# Patient Record
Sex: MALE | Race: OTHER | HISPANIC OR LATINO | URBAN - METROPOLITAN AREA
[De-identification: names, ages, dates, MRNs, and addresses within clinical notes are randomized per-mention and may not be internally consistent; named-entity substitution may affect disease eponyms.]

---
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
## 2022-11-19 NOTE — ED PROVIDER NOTE - HISTORY ATTESTATION, MLM
No heavy lifting, pushing, or pulling greater than 10 lbs. X 1 week.  May shower. No tub soaks.  
I have reviewed and confirmed nurses' notes...

## 2024-02-22 ENCOUNTER — EMERGENCY (EMERGENCY)
Facility: HOSPITAL | Age: 24
LOS: 1 days | Discharge: HOME | End: 2024-02-22
Admitting: STUDENT IN AN ORGANIZED HEALTH CARE EDUCATION/TRAINING PROGRAM
Payer: COMMERCIAL

## 2024-02-22 VITALS
SYSTOLIC BLOOD PRESSURE: 144 MMHG | TEMPERATURE: 98 F | DIASTOLIC BLOOD PRESSURE: 80 MMHG | OXYGEN SATURATION: 98 % | RESPIRATION RATE: 18 BRPM | HEART RATE: 80 BPM

## 2024-02-22 PROCEDURE — 99283 EMERGENCY DEPT VISIT LOW MDM: CPT | Mod: 25

## 2024-02-22 PROCEDURE — 73564 X-RAY EXAM KNEE 4 OR MORE: CPT

## 2024-02-22 PROCEDURE — 99284 EMERGENCY DEPT VISIT MOD MDM: CPT

## 2024-02-22 PROCEDURE — 73564 X-RAY EXAM KNEE 4 OR MORE: CPT | Mod: 26,RT

## 2024-02-22 NOTE — ED ADULT NURSE NOTE - OBJECTIVE STATEMENT
Patient /co of right knee pain X 1 -2 weeks, noted after playing basketball, denies any fall or injury, no obvious deformity, able to bear weight and ambulate w/out any difficulty.

## 2024-02-22 NOTE — ED PROVIDER NOTE - OBJECTIVE STATEMENT
23 M denies pmh p/w R knee pain x 2 weeks.  pt reports dull aching pain over anterior knee, making it hard to straighten leg.  worsened the following day after playing basketball but denies any trauma.  pt reports pain improved since but not reslved.  denies f/c, skin changes, calf pain/swelling, numbness/weakness, paresthesias, trauma/twisting

## 2024-02-22 NOTE — ED PROVIDER NOTE - NSFOLLOWUPINSTRUCTIONS_ED_ALL_ED_FT
Take tylenol 650mg or motrin 400-800mg as needed every 4-6 hours for pain.   REST- Rest your hurting/injured joint or extremity to decrease pain and swelling for 24-48 hours    ICE- Apply ice to area of pain to decreased inflammation and pain, put towel/barrier between ice and skin. You can keep ice on for 20 minutes at a time 4-8 times daily   COMPRESSION- Wear ace wrap or brace for support to reduce swelling.  Make sure not to wrap too tight, loosen if skin feeling numb/tingling or skin turns blue   ELEVATION- Elevate hurting/injured area 6 or more inches about level of heart to decrease swelling/inflammation.  Use pillow under joint to elevate area    [facility] (Thursday afternoons) - call [phone] to schedule    Patellar Tendinitis  A person's leg, showing the location of the patellar tendon.  Patellar tendinitis is also called jumper's knee or patellar tendinopathy. This condition happens when there is damage to the patellar tendon. Tendons are cord-like tissues that connect muscles to bones. The patellar tendon connects the bottom of the kneecap (patella) to the top of the shin bone (tibia).    Patellar tendinitis causes pain in the front of the knee. The condition is classified into the following stages:  Stage 1: You have pain only after activity.  Stage 2: You have pain during and after activity.  Stage 3: You have pain at rest as well as during and after activity. The pain limits your ability to do the activity.  Stage 4: You have tendon tears. The tears severely limit your activity.  What are the causes?  This condition is caused by repeated (repetitive) stress on the tendon. This stress may cause the tendon to stretch, swell, thicken, or tear.    What increases the risk?  The following factors may make you more likely to develop this condition:  Participating in sports that involve running, kicking, and jumping, especially on hard surfaces. These include:  Basketball.  Volleyball.  Soccer.  Track and field.  Training too hard.  Having tight thigh muscles.  Having received steroid injections in the tendon.  Having had knee surgery.  Being 16–40 years old.  Having rheumatoid arthritis, diabetes, or kidney disease. These conditions interrupt blood flow to the knee, causing the tendon to weaken.  What are the signs or symptoms?  The main symptom of this condition is pain and swelling in the front of the knee. The pain usually starts slowly and gradually gets worse. It may become painful to straighten your leg. The pain may get worse when you walk, run, or jump.    How is this diagnosed?  This condition may be diagnosed based on:  Your symptoms.  Your medical history.  A physical exam. During the physical exam, your health care provider may check for:  Tenderness along the tendon just below the patella.  Tightness in your thigh muscles.  Pain when you straighten your knee.  Imaging tests, including:  X-rays. These will show the position and condition of your patella.  An MRI. This will show any abnormality of the tendon.  Ultrasound. This will show any swelling or other abnormalities of the tendon.  How is this treated?  Treatment for this condition depends on the stage of the condition. It may involve:  Avoiding activities that cause pain, such as jumping.  Icing and elevating your knee.  Having sound wave stimulation to promote healing.  Doing physical therapy exercises to improve movement and strength in your knee when pain and swelling improve.  Wearing a knee brace. This may be needed if your condition does not improve with treatment.  Using crutches or a walker. This may be needed if your condition does not improve with treatment.  Surgery. This may be done if you have stage 4 tendinitis.  Follow these instructions at home:  If you have a removable brace:    Wear the brace as told by your health care provider. Remove it only as told by your health care provider.  Check the skin around the brace every day. Tell your health care provider about any concerns.  Loosen the brace if your toes tingle, become numb, or turn cold and blue.  Keep the brace clean.  If the brace is not waterproof:  Do not let it get wet.  Cover it with a watertight covering when you take a bath or shower.  Ask your health care provider when it is safe for you to drive.  Managing pain, stiffness, and swelling    Bag of ice on a towel on the skin.  If directed, put ice on the injured area. To do this:  If you have a removable brace, remove it as told by your health care provider.  Put ice in a plastic bag.  Place a towel between your skin and the bag.  Leave the ice on for 20 minutes, 2–3 times a day.  Remove the ice if your skin turns bright red. This is very important. If you cannot feel pain, heat, or cold, you have a greater risk of damage to the area.  Move your toes often to reduce stiffness and swelling.  Raise (elevate) your knee above the level of your heart while you are sitting or lying down.  Activity    Do not use the injured limb to support your body weight until your health care provider says that you can. Use your crutches or a walker as told by your health care provider.  Do exercises as told by your health care provider or physical therapist.  Return to your normal activities as told by your health care provider. Ask your health care provider what activities are safe for you.  General instructions    Take over-the-counter and prescription medicines only as told by your health care provider.  Do not use any products that contain nicotine or tobacco. These products include cigarettes, chewing tobacco, and vaping devices, such as e-cigarettes. These can delay healing. If you need help quitting, ask your health care provider.  Keep all follow-up visits. This is important.  How is this prevented?  Warm up and stretch before being active.  Cool down and stretch after being active.  Give your body time to rest between periods of activity. You may need to reduce how often you play a sport that requires frequent jumping.  Make sure to use equipment that fits you.  Be safe and responsible while being active. This will help you avoid falls which can damage the tendon.  Do at least 150 minutes of moderate-intensity exercise each week, such as brisk walking or water aerobics.  Maintain physical fitness, including:  Strength.  Flexibility.  Cardiovascular fitness.  Endurance.  Contact a health care provider if:  Your symptoms have not improved in 6 weeks.  Your symptoms get worse.  Summary  Patellar tendinitis is also called jumper's knee or patellar tendinopathy. This condition happens when there is damage to the patellar tendon.  Treatment for this condition depends on the stage of the condition and may include rest, ice, exercises, a knee brace, and surgery.  Do not use the injured limb to support your body weight until your health care provider says that you can.  Take over-the-counter and prescription medicines only as told by your health care provider.  This information is not intended to replace advice given to you by your health care provider. Make sure you discuss any questions you have with your health care provider.

## 2024-02-22 NOTE — ED PROVIDER NOTE - PATIENT PORTAL LINK FT
You can access the FollowMyHealth Patient Portal offered by [facility] by registering at the following website: [email or web address]. By joining [facility]’s FollowMyHealth portal, you will also be able to view your health information using other applications (apps) compatible with our system.

## 2024-02-22 NOTE — ED ADULT NURSE NOTE - JUGULAR VENOUS DISTENTION
DISPLAY PLAN FREE TEXT DISPLAY PLAN FREE TEXT DISPLAY PLAN FREE TEXT DISPLAY PLAN FREE TEXT DISPLAY PLAN FREE TEXT DISPLAY PLAN FREE TEXT DISPLAY PLAN FREE TEXT DISPLAY PLAN FREE TEXT DISPLAY PLAN FREE TEXT DISPLAY PLAN FREE TEXT DISPLAY PLAN FREE TEXT DISPLAY PLAN FREE TEXT DISPLAY PLAN FREE TEXT DISPLAY PLAN FREE TEXT DISPLAY PLAN FREE TEXT DISPLAY PLAN FREE TEXT DISPLAY PLAN FREE TEXT DISPLAY PLAN FREE TEXT DISPLAY PLAN FREE TEXT DISPLAY PLAN FREE TEXT DISPLAY PLAN FREE TEXT DISPLAY PLAN FREE TEXT DISPLAY PLAN FREE TEXT DISPLAY PLAN FREE TEXT DISPLAY PLAN FREE TEXT DISPLAY PLAN FREE TEXT DISPLAY PLAN FREE TEXT DISPLAY PLAN FREE TEXT DISPLAY PLAN FREE TEXT DISPLAY PLAN FREE TEXT DISPLAY PLAN FREE TEXT DISPLAY PLAN FREE TEXT DISPLAY PLAN FREE TEXT DISPLAY PLAN FREE TEXT DISPLAY PLAN FREE TEXT absent

## 2024-02-22 NOTE — ED PROVIDER NOTE - CLINICAL SUMMARY MEDICAL DECISION MAKING FREE TEXT BOX
23 M denies pmh p/w R knee pain x 2 weeks, worse after playing bball but no direct injuries or twisting.  on exam vss, well appearing, ambulatory, RLE: mild ttp over patellar tendon, no effusion no deformity, able to SLR, FROM all joints, SILT, DP/PT Pulse 2+, no calf ttp.  suspect patellar tendonitis.  xray shows no e/o fx or dislocation.  recommend RICE and knee brace.  f/u with ortho outpt.  discussed strict return parameters

## 2024-02-22 NOTE — ED PROVIDER NOTE - CARE PROVIDER_API CALL
Arnaldo Mccracken  Orthopaedic Surgery  [address]  [place] [address]  Phone: [phone]  Fax: [phone]  Follow Up Time:

## 2024-02-22 NOTE — ED PROVIDER NOTE - PHYSICAL EXAMINATION
Gen: well appearing, no acute distress  Skin: warm/dry, no rash noted  Resp: breathing comfortably, speaking in full sentences, no dyspnea  RLE: mild ttp over patellar tendon, no effusion no deformity, able to SLR, FROM all joints, SILT, DP/PT Pulse 2+, no calf ttp  Neuro: alert/oriented, ambulatory

## 2024-02-26 DIAGNOSIS — M25.561 PAIN IN RIGHT KNEE: ICD-10-CM

## 2024-03-08 ENCOUNTER — APPOINTMENT (OUTPATIENT)
Dept: ORTHOPEDIC SURGERY | Facility: CLINIC | Age: 24
End: 2024-03-08

## 2024-03-08 NOTE — ASSESSMENT
[FreeTextEntry1] : LETITIA FERNANDO is a 23 year old male  with right knee pain. I discussed with the patient that their symptoms, signs, and imaging are most consistent with ***. We reviewed the natural history of this condition and treatment options ranging from conservative measures (activity modification, physical therapy, icing, oral anti-inflammatory and/or analgesic medications, steroid injection, HA gel injections, PRP injections) to surgical management. We agreed on the following plan:   XR taken and reviewed with patient today. Activity modification: low impact aerobic activity (stationary bike, elliptical, swimming) Recommend 150 min per week of moderate intensity aerobic activity Start Home Exercises for knee conditioning. Demonstration, resistance bands and handout provided. Physical therapy. Referral provided. Medication:    prescription provided. Advanced imaging: consider MRI if no symptomatic improvement Follow up in 6-8 weeks.

## 2024-03-08 NOTE — HISTORY OF PRESENT ILLNESS
[de-identified] : LETITIA FERNANDO is a 23 year old male who presents with right knee pain. States the onset of pain was No antecedent trauma or injury. Has not experienced previously. Pain is Pain is nonradiating. There is associated There is no associated swelling, stiffness, numbness, paraesthesia or weakness. Exacerbating factors are standing, walking for prolonged periods, climbing stairs, descending stairs, rising from seated position. Has tried Patient ambulates independently. Exercise: Has not tried PT Employment: [employer]

## 2024-03-14 ENCOUNTER — APPOINTMENT (OUTPATIENT)
Dept: ORTHOPEDIC SURGERY | Facility: CLINIC | Age: 24
End: 2024-03-14
Payer: MEDICAID

## 2024-03-14 VITALS
DIASTOLIC BLOOD PRESSURE: 79 MMHG | HEART RATE: 62 BPM | OXYGEN SATURATION: 97 % | HEIGHT: 67 IN | WEIGHT: 194 LBS | SYSTOLIC BLOOD PRESSURE: 124 MMHG | BODY MASS INDEX: 30.45 KG/M2

## 2024-03-14 DIAGNOSIS — M76.51 PATELLAR TENDINITIS, RIGHT KNEE: ICD-10-CM

## 2024-03-14 PROCEDURE — 73564 X-RAY EXAM KNEE 4 OR MORE: CPT | Mod: 50

## 2024-03-14 PROCEDURE — 99203 OFFICE O/P NEW LOW 30 MIN: CPT

## 2024-03-17 PROBLEM — M76.51 PATELLAR TENDINITIS OF RIGHT KNEE: Status: ACTIVE | Noted: 2024-03-17

## 2024-03-17 NOTE — PHYSICAL EXAM
[de-identified] : General: Well-nourished, well-developed, alert, and in no acute distress. Head: Normocephalic. Eyes: Pupils equal, extraocular muscles intact, normal sclera. Nose: No nasal discharge. Cardiovascular: Extremities are warm and well perfused. Distal pulses are symmetric bilaterally. Respiratory: No labored breathing. Extremities: Sensation is intact distally bilaterally. Distal pulses are symmetric bilaterally Lymphatic: No regional lymphadenopathy, no lymphedema Neurologic: No focal deficits Skin: Normal skin color, texture, and turgor Psychiatric: Normal affect  MSK: Examination of [right] knee:   Gait [non-antalgic] Slight varum alignment [Pain] with double leg squat Valgus moment with single leg squat No effusion No erythema, hematoma or skin lesion Nontender to palpation: medial joint line, lateral joint line, medial patellar facet, lateral patellar facet, quad tendon, patellar tendon, pes, Gerdy's tubercle, tibial tuberosity, popliteal fossa, hamstrings, ITB No warmth No Baker's cyst palpable ROM: 0-[120] [No] patellar crepitus   Log roll negative Lachman negative Anterior drawer negative Posterior drawer negative Varus/valgus stress negative at 0 and 30 deg Carmen negative Patellar grind negative Thessaly negative   Examination of [left] knee:   No effusion, erythema, hematoma or skin lesion Nontender to palpation: medial joint line, lateral joint line, medial patellar facet, lateral patellar facet, quad tendon, patellar tendon, pes, Gerdy's tubercle, tibial tuberosity, popliteal fossa, hamstrings, ITB No warmth No Baker's cyst palpable ROM: 0-120 [No] patellar crepitus   Log roll negative Lachman negative Anterior drawer negative Posterior drawer negative Varus/valgus stress negative at 0 and 30 deg Carmen negative Patellar grind negative Thessaly negative   Sensation is intact to light touch over the superficial and deep peroneal nerve distributions and the posterior tibial nerve distribution. Capillary refill is less than two seconds. Posterior tibial and dorsalis pedis pulses 2+ equal bilaterally. No calf swelling or tenderness bilaterally. Strength testing shows hip flexion 5/5, hip adduction 5/5, hip abduction 5/5, knee extension 5/5, knee flexion 5/5, dorsiflexion 5/5, plantar flexion 5/5, EHL 5/5 Reflexes: Patellar 2+, Achilles 2+

## 2024-03-17 NOTE — HISTORY OF PRESENT ILLNESS
[2] : a current pain level of 2/10 [Improving] : improving [Rarely] : ~He/She~ states the symptoms seem to be rarely occuring [de-identified] : LETITIA FERNANDO is a 23 year old male who presents with right knee pain. States the onset of pain was last month (February 10th)  Has not experienced previously. Started after during HIT mobility workout that involved lunges. Pt went to [facility] and was given dx of patellar tendonitis.  Pain is just below the patella and has improved greatly since injury.  Pain is nonradiating. There is no associated swelling, stiffness, numbness, paraesthesia or weakness. Exacerbating factors are working out for long periods of time.  Has tried daily icing of knee and acetaminophen use.  Patient ambulates independently. Exercise: Daily basketball and working out.  Has not tried PT. Employment:  of [employer]  Lives in the [place]

## 2024-03-17 NOTE — ASSESSMENT
[FreeTextEntry1] : LETITIA FERNANDO is a 23 year old male with right knee pain. I discussed with the patient that their symptoms, signs, and imaging are most consistent with patellar tendinitis. We reviewed the natural history of this condition and treatment options. We agreed on the following plan:   XR taken and reviewed with patient today. Activity modification: low impact aerobic activity (stationary bike, elliptical, swimming) Recommend 150 min per week of moderate intensity aerobic activity Start Home Exercises for patellofemoral conditioning. Demonstration and handout provided. Physical therapy. Referral provided. Can use patellar tendon strap/brace. Examples provided. Advanced imaging: consider MRI if no symptomatic improvement Follow up in 6-8 weeks.

## 2024-04-08 NOTE — ED ADULT TRIAGE NOTE - GLASGOW COMA SCALE: EYE OPENING, MLM
Received request via: Pharmacy    Was the patient seen in the last year in this department? Yes  LOV : 12/11/2023   Does the patient have an active prescription (recently filled or refills available) for medication(s) requested? No    Pharmacy Name: [facility]    Does the patient have [facility] Plus and need 100 day supply (blood pressure, diabetes and cholesterol meds only)? Patient does not have SCP  
(E4) spontaneous

## 2025-01-27 NOTE — ED PROVIDER NOTE - NS ED ATTENDING STATEMENT MOD
I have personally performed a face to face diagnostic evaluation on this patient. I have reviewed the ACP note and agree with the history, exam and plan of care, except as noted. limited hip and knee flexion on assessment due to c/o pain and pt refusal/bilateral upper extremity ROM was WFL (within functional limits)/bilateral lower extremity ROM was WFL (within functional limits)